# Patient Record
Sex: FEMALE | Race: WHITE | Employment: STUDENT | URBAN - METROPOLITAN AREA
[De-identification: names, ages, dates, MRNs, and addresses within clinical notes are randomized per-mention and may not be internally consistent; named-entity substitution may affect disease eponyms.]

---
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
## 2024-06-03 ENCOUNTER — APPOINTMENT (OUTPATIENT)
Dept: INTERNAL MEDICINE | Age: 19
End: 2024-06-03

## 2024-06-03 VITALS
OXYGEN SATURATION: 100 % | DIASTOLIC BLOOD PRESSURE: 78 MMHG | RESPIRATION RATE: 16 BRPM | SYSTOLIC BLOOD PRESSURE: 108 MMHG | HEART RATE: 66 BPM | TEMPERATURE: 97.5 F | WEIGHT: 162.59 LBS

## 2024-06-03 DIAGNOSIS — F41.1 GENERALIZED ANXIETY DISORDER: Primary | ICD-10-CM

## 2024-06-03 PROCEDURE — 99213 OFFICE O/P EST LOW 20 MIN: CPT | Performed by: NURSE PRACTITIONER

## 2024-06-03 RX ORDER — ESCITALOPRAM OXALATE 10 MG/1
10 TABLET ORAL DAILY
Qty: 30 TABLET | Refills: 0 | Status: SHIPPED | OUTPATIENT
Start: 2024-06-03

## 2024-06-03 ASSESSMENT — ANXIETY QUESTIONNAIRES
7. FEELING AFRAID AS IF SOMETHING AWFUL MIGHT HAPPEN: MORE THAN HALF THE DAYS
4. TROUBLE RELAXING: 2
5. BEING SO RESTLESS THAT IT IS HARD TO SIT STILL: 2
6. BECOMING EASILY ANNOYED OR IRRITABLE: MORE THAN HALF THE DAYS
1. FEELING NERVOUS, ANXIOUS, OR ON EDGE: 3
GAD7 TOTAL SCORE: 17
6. BECOMING EASILY ANNOYED OR IRRITABLE: 2
IF YOU CHECKED OFF ANY PROBLEMS ON THIS QUESTIONNAIRE, HOW DIFFICULT HAVE THESE PROBLEMS MADE IT FOR YOU TO DO YOUR WORK, TAKE CARE OF THINGS AT HOME, OR GET ALONG WITH OTHER PEOPLE: VERY DIFFICULT
2. NOT BEING ABLE TO STOP OR CONTROL WORRYING: NEARLY EVERY DAY
4. TROUBLE RELAXING: MORE THAN HALF THE DAYS
5. BEING SO RESTLESS THAT IT IS HARD TO SIT STILL: MORE THAN HALF THE DAYS
7. FEELING AFRAID AS IF SOMETHING AWFUL MIGHT HAPPEN: 2
1. FEELING NERVOUS, ANXIOUS, OR ON EDGE: NEARLY EVERY DAY
3. WORRYING TOO MUCH ABOUT DIFFERENT THINGS: NEARLY EVERY DAY
2. NOT BEING ABLE TO STOP OR CONTROL WORRYING: 3
3. WORRYING TOO MUCH ABOUT DIFFERENT THINGS: 3

## 2024-06-03 ASSESSMENT — PATIENT HEALTH QUESTIONNAIRE - PHQ9
1. LITTLE INTEREST OR PLEASURE IN DOING THINGS: NOT AT ALL
1. LITTLE INTEREST OR PLEASURE IN DOING THINGS: NOT AT ALL
5. POOR APPETITE, WEIGHT LOSS, OR OVEREATING: SEVERAL DAYS
CLINICAL INTERPRETATION OF PHQ2 SCORE: NO FURTHER SCREENING NEEDED
10. IF YOU CHECKED OFF ANY PROBLEMS, HOW DIFFICULT HAVE THESE PROBLEMS MADE IT FOR YOU TO DO YOUR WORK, TAKE CARE OF THINGS AT HOME, OR GET ALONG WITH OTHER PEOPLE: VERY DIFFICULT
9. THOUGHTS THAT YOU WOULD BE BETTER OFF DEAD, OR OF HURTING YOURSELF: NOT AT ALL
7. TROUBLE CONCENTRATING ON THINGS, SUCH AS READING THE NEWSPAPER OR WATCHING TELEVISION: SEVERAL DAYS
SUM OF ALL RESPONSES TO PHQ QUESTIONS 1-9: 5
6. FEELING BAD ABOUT YOURSELF - OR THAT YOU ARE A FAILURE OR HAVE LET YOURSELF OR YOUR FAMILY DOWN: SEVERAL DAYS
SUM OF ALL RESPONSES TO PHQ9 QUESTIONS 1 AND 2: 0
3. TROUBLE FALLING OR STAYING ASLEEP OR SLEEPING TOO MUCH: MORE THAN HALF THE DAYS
2. FEELING DOWN, DEPRESSED OR HOPELESS: NOT AT ALL
2. FEELING DOWN, DEPRESSED OR HOPELESS: NOT AT ALL
SUM OF ALL RESPONSES TO PHQ9 QUESTIONS 1 AND 2: 0
4. FEELING TIRED OR HAVING LITTLE ENERGY: NOT AT ALL
CLINICAL INTERPRETATION OF PHQ2 SCORE: NO FURTHER SCREENING NEEDED
CLINICAL INTERPRETATION OF PHQ9 SCORE: MILD DEPRESSION
SUM OF ALL RESPONSES TO PHQ9 QUESTIONS 1 AND 2: 0
8. MOVING OR SPEAKING SO SLOWLY THAT OTHER PEOPLE COULD HAVE NOTICED. OR THE OPPOSITE, BEING SO FIGETY OR RESTLESS THAT YOU HAVE BEEN MOVING AROUND A LOT MORE THAN USUAL: NOT AT ALL
SUM OF ALL RESPONSES TO PHQ9 QUESTIONS 1 AND 2: 0

## 2024-06-03 ASSESSMENT — ENCOUNTER SYMPTOMS
AGITATION: 0
CONFUSION: 0
SLEEP DISTURBANCE: 0
HALLUCINATIONS: 0
NERVOUS/ANXIOUS: 1

## 2024-06-21 ENCOUNTER — TELEPHONE (OUTPATIENT)
Dept: INTERNAL MEDICINE | Age: 19
End: 2024-06-21

## 2024-06-21 ENCOUNTER — APPOINTMENT (OUTPATIENT)
Dept: INTERNAL MEDICINE | Age: 19
End: 2024-06-21

## 2024-06-28 ENCOUNTER — OFFICE VISIT (OUTPATIENT)
Dept: INTERNAL MEDICINE | Age: 19
End: 2024-06-28

## 2024-06-28 VITALS
OXYGEN SATURATION: 99 % | RESPIRATION RATE: 16 BRPM | TEMPERATURE: 97.9 F | WEIGHT: 163 LBS | SYSTOLIC BLOOD PRESSURE: 120 MMHG | BODY MASS INDEX: 24.71 KG/M2 | HEART RATE: 82 BPM | HEIGHT: 68 IN | DIASTOLIC BLOOD PRESSURE: 80 MMHG

## 2024-06-28 DIAGNOSIS — F41.1 GENERALIZED ANXIETY DISORDER: Primary | ICD-10-CM

## 2024-06-28 RX ORDER — ESCITALOPRAM OXALATE 10 MG/1
10 TABLET ORAL DAILY
Qty: 90 TABLET | Refills: 1 | Status: SHIPPED | OUTPATIENT
Start: 2024-06-28

## 2024-06-28 ASSESSMENT — ANXIETY QUESTIONNAIRES
2. NOT BEING ABLE TO STOP OR CONTROL WORRYING: 1
7. FEELING AFRAID AS IF SOMETHING AWFUL MIGHT HAPPEN: NOT AT ALL
2. NOT BEING ABLE TO STOP OR CONTROL WORRYING: SEVERAL DAYS
5. BEING SO RESTLESS THAT IT IS HARD TO SIT STILL: NOT AT ALL
5. BEING SO RESTLESS THAT IT IS HARD TO SIT STILL: 0
4. TROUBLE RELAXING: SEVERAL DAYS
GAD7 TOTAL SCORE: 4
6. BECOMING EASILY ANNOYED OR IRRITABLE: 0
3. WORRYING TOO MUCH ABOUT DIFFERENT THINGS: SEVERAL DAYS
1. FEELING NERVOUS, ANXIOUS, OR ON EDGE: SEVERAL DAYS
IF YOU CHECKED OFF ANY PROBLEMS ON THIS QUESTIONNAIRE, HOW DIFFICULT HAVE THESE PROBLEMS MADE IT FOR YOU TO DO YOUR WORK, TAKE CARE OF THINGS AT HOME, OR GET ALONG WITH OTHER PEOPLE: SOMEWHAT DIFFICULT
6. BECOMING EASILY ANNOYED OR IRRITABLE: NOT AT ALL
4. TROUBLE RELAXING: 1
3. WORRYING TOO MUCH ABOUT DIFFERENT THINGS: 1
7. FEELING AFRAID AS IF SOMETHING AWFUL MIGHT HAPPEN: 0
1. FEELING NERVOUS, ANXIOUS, OR ON EDGE: 1

## 2024-06-28 ASSESSMENT — PATIENT HEALTH QUESTIONNAIRE - PHQ9
1. LITTLE INTEREST OR PLEASURE IN DOING THINGS: NOT AT ALL
SUM OF ALL RESPONSES TO PHQ9 QUESTIONS 1 AND 2: 0
CLINICAL INTERPRETATION OF PHQ2 SCORE: NO FURTHER SCREENING NEEDED
2. FEELING DOWN, DEPRESSED OR HOPELESS: NOT AT ALL
SUM OF ALL RESPONSES TO PHQ9 QUESTIONS 1 AND 2: 0

## 2024-06-28 ASSESSMENT — ENCOUNTER SYMPTOMS
GASTROINTESTINAL NEGATIVE: 1
RESPIRATORY NEGATIVE: 1
NEUROLOGICAL NEGATIVE: 1
NERVOUS/ANXIOUS: 1
CONSTITUTIONAL NEGATIVE: 1

## 2024-06-29 DIAGNOSIS — F41.1 GENERALIZED ANXIETY DISORDER: ICD-10-CM

## 2024-07-01 RX ORDER — ESCITALOPRAM OXALATE 10 MG/1
10 TABLET ORAL DAILY
Qty: 90 TABLET | Refills: 0 | Status: SHIPPED | OUTPATIENT
Start: 2024-07-01

## (undated) NOTE — ED AVS SNAPSHOT
[facility] Immediate Care in [facility].  [address] [facility]    Phone:  [phone]    Fax:  [phone]   MRN: [id number]    Department:  [facility] Immediate Care in [address] [place] [address]   Date of Visit: service to you, we would appreciate any positive or negative feedback related to the care you received in our Immediate Care. Please call our [facility] at [phone]. Your Immediate Care team is here to serve you. You are our top priority.     You w [address] [facility]  WEEKENDS AND HOLIDAYS 8AM - 6PM    I certified that I have received a copy of the aftercare instructions; that these instructions have been explained to me; all questions pertaining to these instructions have been answered

## (undated) NOTE — LETTER
[address]  [facility] [address]  Dept: [phone]  Dept Fax: [phone]  Loc: [phone]      October 31, 2017    Patient: Emerita West   Date of Visit: 10/31/2017       To Whom It May Concern:    Ra

## (undated) NOTE — LETTER
7/22/2020              [address] [place] [address]        Lorenza Cogan [address]         Dear Georgette Lines,    [address] records indicate that the tests ordered for you by Yoselyn Munguia MD  have not been done.   If you have, in fact, already completed the

## (undated) NOTE — LETTER
[address]  [place] [address]  Dept: [phone]  Dept Fax: [phone]  Loc: [phone]      October 31, 2017    Patient: Thong Valerio   Date of Visit: 10/31/2017       To Whom It May Concern:    Ra

## (undated) NOTE — ED AVS SNAPSHOT
[facility] AND [facility] Immediate Care in [facility] 18.  [address] [facility]    Phone:  [phone]    Fax:  [id number]   MRN: [id number]    Department:  [facility] Immediate Care in [address] [place] [address]   Date of Visit: service to you, we would appreciate any positive or negative feedback related to the care you received in our Immediate Care. Please call our [address] at [phone]. Your Immediate Care team is here to serve you. You are our top priority.     You w [address] [facility]  WEEKENDS AND HOLIDAYS 8AM - 6PM    I certified that I have received a copy of the aftercare instructions; that these instructions have been explained to me; all questions pertaining to these instructions have been answered